# Patient Record
Sex: MALE | URBAN - NONMETROPOLITAN AREA
[De-identification: names, ages, dates, MRNs, and addresses within clinical notes are randomized per-mention and may not be internally consistent; named-entity substitution may affect disease eponyms.]

---
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
## 2025-04-16 NOTE — PROCEDURE: ISOTRETINOIN MONITORING
abdominal pain on the discharge service for the patient. I have reviewed and made amendments to the documentation where necessary. Dosing Month 2 (Required For Cumulative Dosing): 40mg Daily

## 2025-07-02 NOTE — PROCEDURE: ISOTRETINOIN MONITORING
Patient has not had a colonoscopy  Patient did have cologuard 2 years ago  Patient sending cologuard 7/2/25 as well   Patient declines any issues or concerns  Please advise if should schedule  or wait until results of cologuard come back?   Male Completion Statement: After discussing his treatment course we decided to discontinue isotretinoin therapy at this time. He shouldn't donate blood for one month after the last dose. He should call with any new symptoms of depression.